# Patient Record
(demographics unavailable — no encounter records)

---

## 2024-12-04 NOTE — HISTORY OF PRESENT ILLNESS
[FreeTextEntry1] : Informant: patient & wife    ZAKIYA GRUBER is a 73 year man who is here for cognitive evaluation. Wife reports a  gradual progressive cognitive decline over the past year.  He forgets conversations, events, repeats and asks same questions.  Some losing things.  They deny wandering or getting lost.  no personality change or difficulty with words. They deny problems with Attention and focusing.   Neuro ROS: -Hx head trauma: denies -Headache: denies -Vertigo/lightheadedness: denies -Seizures: denies -Weakness: denies -Changes in taste/smell: denies -Gait/Balance/falls: denies change -Tremor/abnormal movements: denies -Dysphagia: denies -Syncope/autonomic dysfunction: denies -Fluctuations in consciousness: denies   Neuropsychiatric: -Sleep:  Denies difficulty falling or staying asleep. Denies verbalizations, movements, abnormal dreams movements + snoring, -Appetite: some weight loss  -Anxiety: denies anxiety -Depression/Apathy: denies changes in interests, energy, guilt, or hopelessness -Suicidal/Homicidal ideations: denies -Hallucinations/illusions: denies   PHQ2 over the last 2 weeks do u have?  (0-none, 1-several days, 2-more than half days, 3-nearly every day) -little interest or pleasure in doing things:0 -feeling down, depressed, or hopeless:0   FUNCTIONALITY  QUESTIONS  ACTIVITIES of DAILY LIVING ([place])         (0) needs help (1) independent Bathing/Showerin Dressin Toiletin Transferrin Continence: 1 Feedin   INDEPENDENT ACTIVITIES of DAILY LIVING ([facility])      (0) unable (2) needs assistance (3) independent Ability to Use Telephone: Shopping: Food Preparation: Housekeeping: Laundry: Transportation: drives  Responsibility for Own Medications: wife helps at night Ability to Handle Finances: wife took over    SOCIAL HX -Smoking Hx: currently smokes 1/2 ppd  -Illicit drugs: denies -Alcohol Hx: 1-2 drinks a night  -Birthplace: [facility]  -Marital status:  -Lives with:wife -Children:3 -Occupation: retired had store     PMHx -BPH -cholethiasis DM2 CAD htn   PSH CABG 3/2024  FAMILY HX -Dementia: father, 2 brothers   ALLERGIES  PCN  MEDs amlodipine ASA 81mg  gabapentin 300mg bid  farexega metformin rosuvastatin chantix   ROS Constitutional:  No fevers or chills.  No fatigue. Eye: No blurred vision, diplopia, eye pain or visual problems. Head/Ear/Nose/Throat: No hearing loss, tinnitus, sore throat or ear pain. Respiratory: No cough, wheezing or shortness of breath. Cardiovascular: No chest pain, palpitations or dyspnea on exertion. Gastrointestinal: No nausea, vomiting, constipation or diarrhea. Genitourinary: No incontinence, urgency or frequency.+bph  Integument/breast: No skin lesions. Hematologic/lymphatic: No blood clots, easy bruising/bleeding or anemia. Endocrine: No thyroid disorders + diabetes. Musculoskeletal: + diabetic neuropathy  Neurological: see HPI Psychiatric: see HPI   GENERAL EXAMINATION General:  Pleasant, well groomed, and in no apparent distress. Skin: Anicteric with no significant lesions noted. Eyes: Anicteric Head/Ears/Mouth/Nose/Throat: NC/AT, conjunctiva clear. Neck: Supple, full ROM. Respiratory: No respiratory distress Cardiovascular: Regular rate and rhythm. Gastrointestinal: Soft, non-tender, non-distended with no masses. Extremities: No edema or calf tenderness, Back: No deformities, no spinal tenderness   NEUROLOGIC EXAMINATION Mental Status: awake, alert, pleasant, Cranial Nerves: VFF PERRL, EOMI without nystagmus, facial sensation intact, face symmetric, hearing intact to fingerrub, palate raises symmetrically, shoulder shrug intact, tongue midline. No dysarthria. Motor: -Tone:  normal, decreased motility r wrist from old injury -Strength: No pronator drift. Strength is 5/5 in bilateral upper and lower extremities Adventitial movements: No tremors noted. Sensation: grossly intact. Romberg is negative. Reflexes: 2+ at brachioradialis, biceps, triceps, patellar, and achilles bilaterally. Coordination: Intact with finger-to-nose bilaterally. Gait: Steady, with a narrow base. Able to walk on heels and toes. unable to tandem.  Normal pull test Frontal release signs: No grasp reflex. +palmomental reflex. No glabellar reflex.  workup MMSE 2024  19/30 orientation 8/10 not season, day, world backwards 3/5, recall 0/3, difficulty repeating , naming 1/2 , poor drawing, poor sentence labs- done at [facility]    ASSESSMENT: Patient with progressive cognitive decline over past year. ? functional decline. A progressive cognitive decline in more than one cognitive domain, affecting her independence, is consistent with dementia. Most likely diagnosis based on symptoms, exam, age and history is mixed dementia and may include Alzheimer disease/late, cerebrovascular disease and LBD.      PLAN:  -Advanced Directives: deferred Cognitive symptoms: -not candidate lecanamab: low mmse, smoking  -MRI brain w/o contrast -no Neuropsychological tests:low mmse -Encouraged exercise, social activities and healthy diet smoking - smoking cessation couseling given  - does not want to stop    Follow-up: call after test